# Patient Record
Sex: MALE | Race: WHITE | URBAN - METROPOLITAN AREA
[De-identification: names, ages, dates, MRNs, and addresses within clinical notes are randomized per-mention and may not be internally consistent; named-entity substitution may affect disease eponyms.]

---

## 2023-09-25 LAB — PROSTATE SPECIFIC ANTIGEN: 2.24 NG/ML

## 2023-10-27 LAB — TESTOSTERONE TOTAL: 505 NG/DL

## 2023-11-06 LAB — PROSTATE SPECIFIC ANTIGEN: 2.34 NG/ML

## 2024-05-13 LAB
ALBUMIN: 4.2 G/DL
ALK PHOSPHATASE: 47 U/L
ALT SERPL-CCNC: 34 U/L
AST SERPL-CCNC: 39 U/L
BILIRUB SERPL-MCNC: 0.9 MG/DL
BUN BLDV-MCNC: 23 MG/DL
CALCIUM SERPL-MCNC: 9.1 MG/DL
CHLORIDE BLD-SCNC: 107 MMOL/L
CO2: 22 MMOL/L
CREAT SERPL-MCNC: 1.12 MG/DL
GFR AFRICAN AMERICAN: 81.8 ML/M1.7
GFR NON-AFRICAN AMERICAN: 67.6 ML/M1.7
GLUCOSE: 71 MG/DL
HCT VFR BLD CALC: 43.2 %
HEMOGLOBIN: 14.7 G/DL
POTASSIUM SERPL-SCNC: 4.1 MMOL/L
SODIUM BLD-SCNC: 140 MMOL/L
TESTOSTERONE TOTAL: 190 NG/DL
TOTAL PROTEIN: 6.8 G/DL

## 2024-09-04 ENCOUNTER — HOSPITAL ENCOUNTER (EMERGENCY)
Age: 59
Discharge: HOME | End: 2024-09-04
Attending: EMERGENCY MEDICINE

## 2024-09-04 ENCOUNTER — APPOINTMENT (OUTPATIENT)
Dept: GENERAL RADIOLOGY | Age: 59
End: 2024-09-04

## 2024-09-04 VITALS
DIASTOLIC BLOOD PRESSURE: 83 MMHG | BODY MASS INDEX: 32.2 KG/M2 | TEMPERATURE: 97.5 F | SYSTOLIC BLOOD PRESSURE: 163 MMHG | WEIGHT: 230 LBS | HEART RATE: 70 BPM | OXYGEN SATURATION: 99 % | RESPIRATION RATE: 16 BRPM | HEIGHT: 71 IN

## 2024-09-04 DIAGNOSIS — S90.32XA CONTUSION OF LEFT FOOT, INITIAL ENCOUNTER: Primary | ICD-10-CM

## 2024-09-04 DIAGNOSIS — S91.312A LACERATION OF LEFT FOOT, INITIAL ENCOUNTER: ICD-10-CM

## 2024-09-04 PROCEDURE — 6370000000 HC RX 637 (ALT 250 FOR IP)

## 2024-09-04 PROCEDURE — 99284 EMERGENCY DEPT VISIT MOD MDM: CPT

## 2024-09-04 PROCEDURE — 73630 X-RAY EXAM OF FOOT: CPT

## 2024-09-04 PROCEDURE — 90715 TDAP VACCINE 7 YRS/> IM: CPT

## 2024-09-04 PROCEDURE — 6360000002 HC RX W HCPCS

## 2024-09-04 PROCEDURE — 90471 IMMUNIZATION ADMIN: CPT

## 2024-09-04 PROCEDURE — 12001 RPR S/N/AX/GEN/TRNK 2.5CM/<: CPT

## 2024-09-04 RX ORDER — IBUPROFEN 600 MG/1
600 TABLET, FILM COATED ORAL ONCE
Status: COMPLETED | OUTPATIENT
Start: 2024-09-04 | End: 2024-09-04

## 2024-09-04 RX ORDER — OXYCODONE AND ACETAMINOPHEN 5; 325 MG/1; MG/1
1 TABLET ORAL ONCE
Status: COMPLETED | OUTPATIENT
Start: 2024-09-04 | End: 2024-09-04

## 2024-09-04 RX ADMIN — OXYCODONE HYDROCHLORIDE AND ACETAMINOPHEN 1 TABLET: 5; 325 TABLET ORAL at 21:35

## 2024-09-04 RX ADMIN — IBUPROFEN 600 MG: 600 TABLET, FILM COATED ORAL at 21:28

## 2024-09-04 RX ADMIN — TETANUS TOXOID, REDUCED DIPHTHERIA TOXOID AND ACELLULAR PERTUSSIS VACCINE, ADSORBED 0.5 ML: 5; 2.5; 8; 8; 2.5 SUSPENSION INTRAMUSCULAR at 21:35

## 2024-09-04 ASSESSMENT — PAIN DESCRIPTION - FREQUENCY: FREQUENCY: CONTINUOUS

## 2024-09-04 ASSESSMENT — PAIN SCALES - GENERAL
PAINLEVEL_OUTOF10: 8

## 2024-09-04 ASSESSMENT — PAIN DESCRIPTION - ORIENTATION
ORIENTATION: LEFT
ORIENTATION: LEFT

## 2024-09-04 ASSESSMENT — ENCOUNTER SYMPTOMS
BACK PAIN: 0
COLOR CHANGE: 1

## 2024-09-04 ASSESSMENT — PAIN DESCRIPTION - LOCATION
LOCATION: FOOT
LOCATION: FOOT

## 2024-09-04 ASSESSMENT — PAIN - FUNCTIONAL ASSESSMENT: PAIN_FUNCTIONAL_ASSESSMENT: 0-10

## 2024-09-04 ASSESSMENT — PAIN DESCRIPTION - PAIN TYPE: TYPE: ACUTE PAIN

## 2024-09-05 ENCOUNTER — CLINICAL DOCUMENTATION (OUTPATIENT)
Facility: HOSPITAL | Age: 59
End: 2024-09-05

## 2024-09-05 NOTE — ED PROVIDER NOTES
[facility] Emergency Department  [address]  [facility] [address]  Phone: [phone]  Fax: [phone]      Attending Physician Attestation    I performed a history and physical examination of the patient and discussed management with the mid level provider. I reviewed the mid level provider's note and agree with the documented findings and plan of care. Any areas of disagreement are noted on the chart. I was personally present for the key portions of any procedures. I have documented in the chart those procedures where I was not present during the key portions. I have reviewed the emergency nurses triage note. I agree with the chief complaint, past medical history, past surgical history, allergies, medications, social and family history as documented unless otherwise noted below. Documentation of the HPI, Physical Exam and Medical Decision Making performed by mid level providers is based on my personal performance of the HPI, PE and MDM. For Physician Assistant/ Nurse Practitioner cases/documentation I have personally evaluated this patient and have completed at least one if not all key elements of the E/M (history, physical exam, and MDM). Additional findings are as noted.      CHIEF COMPLAINT       Chief Complaint   Patient presents with    Foot Injury    Laceration     Left foot         HISTORY OF PRESENT ILLNESS    Madi Florentino is a 59 y.o. male who presents for evaluation of a left foot injury.  The patient reports that approximately 5 hours ago he accidentally dropped a 45 pound plate onto his left foot.  He caused a laceration to the top of the foot.  He complains of associated pain, swelling and bruising.  He states that he did have a previous fracture to the left foot years ago.  He saw podiatrist at that time.  The patient took ibuprofen for pain without improvement.  He does not list any palliating factors.  His pain is worse with ambulation.  The patient denies

## 2024-09-05 NOTE — DISCHARGE INSTRUCTIONS
Go to your primary care physician or return to the emergency department in 7-10 days to have your sutures removed.    If you are having persistent or worsening pain in your foot, as discussed you may need a repeat x-ray in the next 7 to 10 days.  Orthopedic surgery follow-up information provided, call and schedule an appointment if needed.  You can also follow-up with your primary care physician on this.    Elevate your left leg above the level of your heart whenever possible to reduce pain and swelling.  Indirectly apply ice to the affected area for 20 minutes at a time up to 4 times daily to reduce pain and swelling.    For pain use acetaminophen (Tylenol) or ibuprofen (Motrin / Advil), unless prescribed medications that have acetaminophen or ibuprofen (or similar medications) in it.  You can take over the counter acetaminophen tablets (1 - 2 tablets of the 500-mg strength every 6 hours) or ibuprofen tablets (2 tablets every 4 hours).    You can shower with the laceration, would avoid baths or swimming in lakes / rivers.  Apply bacitracin / triple antibiotic ointment / Neosporin / Vaseline to the wound twice a day.    When you go outside, place sunscreen on the healing wound after the sutures have been removed for the next year to help with scarring.    PLEASE RETURN TO THE EMERGENCY DEPARTMENT IMMEDIATELY for worsening symptoms, redness around the wound or redness streaking up the body part, white drainage from the wound, or if you develop any concerning symptoms such as: high fever not relieved by acetaminophen (Tylenol) and/or ibuprofen (Motrin / Advil), chills, shortness of breath, chest pain, feeling of your heart fluttering or racing, persistent nausea and/or vomiting, vomiting up blood, blood in your stool, numbness, loss of consciousness, weakness or tingling in the arms or legs or change in color of the extremities, changes in mental status, persistent headache, blurry vision, loss of bladder / bowel

## 2024-09-05 NOTE — PROGRESS NOTES
Patient Assistance                   Additional notes: Reviewed chart and no assistance is available for this treatment.

## 2024-09-05 NOTE — ED TRIAGE NOTES
Mode of arrival (squad #, walk in, police, etc) : walk in         Chief complaint(s): left foot pain/laceration         Arrival Note (brief scenario, treatment PTA, etc).: pt presents to the ER with left foot pain after a 45lb weight plate fell on the top of his left foot earlier today. Pt took motrin at 1600 PTA. Distal PMS intact.         C= \"Have you ever felt that you should Cut down on your drinking?\"  No  A= \"Have people Annoyed you by criticizing your drinking?\"  No  G= \"Have you ever felt bad or Guilty about your drinking?\"  No  E= \"Have you ever had a drink as an Eye-opener first thing in the morning to steady your nerves or to help a hangover?\"  No      Deferred []      Reason for deferring: N/A    *If yes to two or more: probable alcohol abuse.*

## 2024-09-05 NOTE — ED PROVIDER NOTES
[facility] Emergency Department  [address]  [facility] [address]  Phone: [phone]  Fax: [phone]        Pt Name: Madi Florentino  MRN: [id number]  Birthdate 1965  Date of evaluation: 9/4/24    CHIEFCOMPLAINT       Chief Complaint   Patient presents with    Foot Injury    Laceration     Left foot       HISTORY OF PRESENT ILLNESS (Location/Symptom, Timing/Onset, Context/Setting, Quality, Duration, Modifying Factors, Severity)      Madi Florentino is a 59 y.o. male with no pertinent PMH who presents to the ED via private auto with complaint of left foot pain.  Patient states he dropped a 45 pound weight on his left foot about 5 hours ago, did take ibuprofen prior to arrival with minimal relief.  Patient with bruising to his foot, states he is able to ambulate without difficulty, denies any previous injury or surgery to the foot.  Does not follow with orthopedic surgery for any reason.  Rating his pain an 8-9 out of 10 does not know when his last tetanus immunization was.    PAST MEDICAL / SURGICAL / SOCIAL / FAMILY HISTORY     PMH:  has no past medical history on file.  Surgical History:  has no past surgical history on file.  Social History:  reports that he has never smoked. He has never used smokeless tobacco. He reports current alcohol use. He reports that he does not use drugs.  Family History: has no family status information on file.    family history is not on file.  Psychiatric History: None    Allergies: Patient has no known allergies.    Home Medications:   Prior to Admission medications    Medication Sig Start Date End Date Taking? Authorizing Provider   ibuprofen (ADVIL;MOTRIN) 800 MG tablet Take 1 tablet by mouth every 8 hours as needed for Pain. 4/17/14  Yes Sumit Johns MD   ibuprofen (ADVIL;MOTRIN) 800 MG tablet Take 1 tablet by mouth every 8 hours as needed for Pain 9/26/15   Sumit Johns MD   esomeprazole Magnesium (NEXIUM) 20 MG PACK Take 40  mg by mouth daily.  Patient not taking: Reported on 9/4/2024    Provider, MD Dianna       REVIEW OF SYSTEMS  (2-9 systems for level 4, 10 ormore for level 5)      Review of Systems   Musculoskeletal:  Positive for joint swelling (left foot). Negative for arthralgias, back pain and gait problem.   Skin:  Positive for color change (bruising left foot) and wound (laceration top of left foot). Negative for pallor and rash.   Neurological:  Negative for weakness and numbness.           All other systems negative except as marked.     PHYSICAL EXAM  (up to 7 for level 4, 8 or more for level 5)      INITIAL VITALS:  height is 1.803 m (5' 11\") and weight is 104.3 kg (230 lb). His temperature is 97.5 °F (36.4 °C). His blood pressure is 163/83 (abnormal) and his pulse is 70. His respiration is 16 and oxygen saturation is 99%.      Vital signs reviewed.    Physical Exam  Vitals and nursing note reviewed.   Constitutional:       General: He is not in acute distress.     Appearance: Normal appearance. He is obese. He is not ill-appearing, toxic-appearing or diaphoretic.   HENT:      Head: Normocephalic and atraumatic.      Right Ear: External ear normal.      Left Ear: External ear normal.      Nose: Nose normal. No congestion or rhinorrhea.      Mouth/Throat:      Mouth: Mucous membranes are moist.      Pharynx: Oropharynx is clear.   Eyes:      General: No scleral icterus.        Right eye: No discharge.         Left eye: No discharge.      Conjunctiva/sclera: Conjunctivae normal.   Cardiovascular:      Rate and Rhythm: Normal rate and regular rhythm.      Pulses: Normal pulses.   Pulmonary:      Effort: Pulmonary effort is normal.      Breath sounds: No stridor.   Musculoskeletal:         General: Swelling and tenderness present. No deformity or signs of injury. Normal range of motion.      Cervical back: No rigidity.      Right lower leg: No edema.      Left lower leg: No edema.      Comments: diffuse bruising to the

## 2024-11-11 LAB
ALBUMIN: 3.8 G/DL
ALK PHOSPHATASE: 45 U/L
ALT SERPL-CCNC: 31 U/L
AST SERPL-CCNC: 33 U/L
BILIRUB SERPL-MCNC: 0.6 MG/DL
BUN BLDV-MCNC: 22 MG/DL
CALCIUM SERPL-MCNC: 8.8 MG/DL
CHLORIDE BLD-SCNC: 109 MMOL/L
CO2: 30 MMOL/L
CREAT SERPL-MCNC: 1.39 MG/DL
GFR AFRICAN AMERICAN: 63.5 ML/M1.7
GFR NON-AFRICAN AMERICAN: 52.5 ML/M1.7
GLUCOSE: 82 MG/DL
POTASSIUM SERPL-SCNC: 4.7 MMOL/L
PROSTATE SPECIFIC ANTIGEN: 2.88 NG/ML
SODIUM BLD-SCNC: 139 MMOL/L
TESTOSTERONE TOTAL: 174 NG/DL
TOTAL PROTEIN: 6.3 G/DL

## 2024-11-12 LAB
HCT VFR BLD CALC: 42.2 %
HEMOGLOBIN: 14.2 G/DL

## 2025-02-18 LAB
HCT VFR BLD CALC: 44.9 %
HEMOGLOBIN: 14.9 G/DL
TESTOSTERONE TOTAL: 801 NG/DL